# Patient Record
Sex: FEMALE | Race: WHITE
[De-identification: names, ages, dates, MRNs, and addresses within clinical notes are randomized per-mention and may not be internally consistent; named-entity substitution may affect disease eponyms.]

---

## 2020-11-10 NOTE — NUR
ADMISSION NOTE

Received patient from ER via leon, received report from ISAAK BAUMAN. Patient admitted with 
diagnosis of ACUTE CHOLECYSTITIS. Patient oriented to hospital routine, call light, 
toileting and safety-patient verbalized understanding.

## 2020-11-10 NOTE — NUR
Patient triaged and placed on wall. VSS and patient appears in no acute 
distress at this time. Accompanied by EMTs, awaiting available bed, and MD 
notified of need for MSE.

## 2020-11-10 NOTE — NUR
Initial RN notes



Pt asleep, easily awakens, no s/s distress noted.  Pt denies pain at this time, was given 
morphine in ER.  IVF started at ordered rate on L. AC 20G good blood return.  Call light 
within reach.  Bed low, locked, siderails up x2, alarm on.  To monitor.

## 2020-11-10 NOTE — NUR
Patient presented to ER C/O abdominal pain. Patient BIB BLS to ER, A&Ox4, temp 
100.0, skin pink & warm, denies N/V/D, pain 10/10. Patient states she has 
adominal pain x1 week. Patient reports PMD referred for surgical intervention 
to remove IUD, patient did not follow-up.

## 2020-11-10 NOTE — NUR
Patient will be admitted to care of [facility].  Admitted to MED SURG unit. 
Belongings list completed.  Complete and up to date summary report printed. 
SBAR report to be given at bedside with opportunity for questions.

## 2020-11-10 NOTE — NUR
-------------------------------------------------------------------------------

            *** Note annette in EDM - 11/10/20 at 2142 by MARCELO ***            

-------------------------------------------------------------------------------

[facility]  Admitted to MED SURG unit.  Will go 
to room 107B.  Belongings list completed.  Complete and up to date summary 
report printed. SBAR report to be given at bedside with opportunity for 
questions.

## 2020-11-11 NOTE — NUR
ROUNDS

PATIENT RESTING COMFORTABLY IN BED, NOT IN DISTRESS, VITALS STABLE. NO PAIN AT THIS TIME. 
ASSESSMENT DONE AND DOCUEMNTED. SEE FLOWSHEET. NEEDS ATTENDED TO. SAFETY MEASURES IN PLACED. 
CALL LIGHT PLACED WITHIN REACH.

## 2020-11-11 NOTE — NUR
Dietitian Recommendations 

*Recommend continue Low Fat Diet

*Assess educational needs upon next visit if/when applicable. 



Please see Nutrition Assessment for details.

NEVILLE BEAL

## 2020-11-11 NOTE — NUR
Closing notes



Pt asleep, easily awakens, no s/s distress noted.  Pt states pain is ok at this time.  IVF 
infusing at ordered rate on L. AC 20G clear and patent.  Call light within reach.  Bed low, 
locked, siderails up x2.  To endorse to AM nurse.

## 2020-11-11 NOTE — NUR
NURSE NOTE

ADMINISTERED PAIN MEDICATION, 8/10, PATIENT IN BED, EYES CLOSED, BED IN LOW AND LOCKED 
POSITION, CALL LIGHT WITHIN REACH

## 2020-11-11 NOTE — NUR
Pain 



Pt c/o severe R.Abd pain, medicated with Morphine 2mg IVP as needed.  IVF infusing at 
ordered rate L. AC no s/s infiltration.  Call light within reach.  To monitor.

## 2020-11-11 NOTE — NUR
Rounds 



Pt asleep, no s/s distress noted.  IVF infusing at ordered rate clear and patent.  Call 
light within reach.  To monitor.

## 2020-11-11 NOTE — NUR
Nutrition Update



Mina scale 18 noted.

Pt admitted for acute cholecystitis 

Diet: NPO

BMI: 20.8 kg/m2



RD to follow per nutrition care standards

## 2020-11-11 NOTE — NUR
nurse note

assisted patient to ambulate to the bathroom, returned to bed, respirations even, non 
labored, bed in low and locked position, call light within reach

## 2020-11-11 NOTE — NUR
closing note

Provided SBAR to night RN, patient in bed, respirations even, non labored, bed in low and 
locked position, call light within reach, endorsed care to night RN

## 2020-11-11 NOTE — NUR
NURSE NOTE

OBTAINED VS, MRSA, AND URINE SPECIMEN COLLECTED, PATIENT IN BED, RESPIRATIONS EVEN, NON 
LABORED, BED IN LOW AND LOCKED POSITION, CALL LIGHT WITHIN REACH

## 2020-11-11 NOTE — NUR
nurse note

patient complaining of pain, requesting pain medication, repositioned patient, respirations 
even, non labored, bed in low and locked position, call light within reach, bed alarm on

## 2020-11-11 NOTE — NUR
NURSE NOTE

RETURNED FROM SURGERY, PATIENT IN BED, RESPIRATIONS EVEN, NON LABORED, AMBULATED TO 
BATHROOM, VOIDED, RETURNED TO BED, OBTAINED VS, BED IN LOW AND LOCKED POSITION, CALL LIGHT 
WITHIN REACH, SCD'S ON IVF'S RUNNING AS ORDERED, BED ALARM ON

## 2020-11-11 NOTE — NUR
nurse note

assisted patient ambulate to the bathroom, returned to bed, administered medications, hung 
new IVF"s as ordered.  Patient states 6/10 pain but does not want medication yet

## 2020-11-11 NOTE — NUR
Bathroom



Pt called to use bathroom, assisted to bathroom, steady gait, pt denies dizziness.  Pt 
voided.  To monitor.

## 2020-11-11 NOTE — NUR
OPENING NOTE

RECEIVED BEDSIDE SBAR FROM NIGHT RN, PATIENT IN BED, RESPIRATIONS EVEN, NON LABORED, BED IN 
LOW AND LOCKED POSITION, CALL LIGHT WITHIN REACH,

## 2020-11-12 NOTE — NUR
TARYN EPPSSYN 

HUNG ZOSYN AS ORDERED RATE. NO S/S OF ADVERSE REACTION NOTED. PT TOLERATING WELL. NO S/S OF 
ACUTE DISTRESS NOTED. CALL LIGHT WITHIN REACH. SIDE RAILS UP. BED LOCKED IN LOWEST POSITION. 
SAFETY PRECAUTIONS MAINTAINED. WILL CONTINUE TO MONITOR.

## 2020-11-12 NOTE — NUR
PAIN/MORPHINE

PT REPORTING ABDOMINAL INCISION PAIN. ADMINISTERED MORPHINE PAIN MEDICATIONS AS ORDERED PRN. 
DISCUSSED MEDICATION ACTIONS AND POTENTIAL SIDE EFFECTS. PT VERBALIZED UNDERSTANDING. 
BREATHING EVEN AND UNLABORED TO ROOM AIR. CALL LIGHT WITHIN REACH. BED LOCKED IN LOWEST 
POSITION. SAFETY PRECAUTIONS IN PLACE. WILL CONTINUE TO MONITOR.

## 2020-11-12 NOTE — NUR
wide awake now, eating the [employer] sandwich, brought from home, to the patient, only 1/2 
sandwich, and one big bowl of soup.

denied pain right now, IS initiated, went up to 500, x 5times.  Right now getting ZYVOX 
ivpb.  Fully aware after all abx given, she will get out of be to ambulate with staff.

Denied pain at this time

## 2020-11-12 NOTE — NUR
CONSULTATION PAGED/CALLED

Reason for Consultation: []  GANGRENOUS CHOLECYSTITIS

Person Who was Notified: []  JESSICA

Consulting Physician: []  DR BASSETT

Consultant Specialty: []  ID

Ordering Physician: []  DR CARO

## 2020-11-12 NOTE — NUR
sleepy when first met, few words exchanged.  c/o of pain on incisional sites, one po NORCO 
given, with relief

K+ down to 3.2, 40meq po given now.  did not eat any breakfast, just want to rest now .

## 2020-11-12 NOTE — NUR
OPENING NOTES

RECEIVED REPORT FROM DAY SHIFT RN. PT RESTING IN BED, ALERT & ORIENTED X4. BREATHING EVEN 
AND UNLABORED TO ROOM AIR. PT ENRIQUE ANY SHORTNESS OF BREATH. IV ON LEFT AC 20G INTACT, IVF 
RUNNING AS ORDERED RATE. NO SIGNS OF INFILTRATION NOTED. CALL LIGHT WITHIN REACH. SIDE RAILS 
UP X3. BED LOCKED IN LOWEST POSITION. SAFETY PRECAUTIONS MAINTAINED. WILL CONTINUE TO 
MONITOR.

## 2020-11-12 NOTE — NUR
SS NOTES:



MSW was referred by SS to see patient for self-pay and DCP.



MSW met with patient at bedside. Pt was alert and oriented. Pt was grimacing but cooperative 
throughout the interview. Patient stated she is in pain and needed to use the restroom; MSW 
escorted patient to restroom. Pt stated she lives in [place] but is currently staying at 
her friend Benjamin's home in [place]. Patient stated she is unemployed and no source of 
income. Pt denies any history or current mental health or substance use/abuse. MSW 
encouraged patient to provide necessary paperwork for [employer]; pt agreed. When discharge, 
pt's friend Benjamin will pick her up. [facility] resource and [employer] phone number 
provided. No further SS needs identified.

## 2020-11-13 NOTE — NUR
RN NOTES:

CNA CAME AND STATED THAT PT STATED WHILE PT WAS IN THE RESTROOM , ( WHEN SHE WAS GETTING UP 
FROM THE COMMODE) SHE HIT HER RIGHT EYELID CORNER ON THE IV PUMP , WENT TO THE PT ROOM 
ASSESSED , PT STATED SHE HAS MILD PAIN ON THE AREA , NO REDNESS , NO CUTS OR BRUISES ,NO 
BUMPS OR SWELLING NOTICED AT THIS TIME . WILL NOTIFY PRIMARY RN AND WILL REQUEST TO MONITOR 
IT . PROVIDED ICE PACK .

## 2020-11-13 NOTE — NUR
CLOSING NOTES

PT RESTING IN BED. BREATHING EVEN AND UNLABORED TO ROOM AIR. PT ENRIQUE ANY SHORTNESS OF 
BREATH. IV ON LEFT AC 20G INTACT, IVF RUNNING AS ORDERED RATE. NO SIGNS OF INFILTRATION 
NOTED. CALL LIGHT WITHIN REACH. SIDE RAILS UP X3. BED LOCKED IN LOWEST POSITION. SAFETY 
PRECAUTIONS MAINTAINED. ALL NEEDS ARE MET THROUGHOUT SHIFT. WILL CONTINUE TO MONITOR UNTIL 
ENDORSE TO DAY SHIFT RN.

## 2020-11-13 NOTE — NUR
RN ROUNDS

ASSESSED AND RECHECKED RIGHT EYELID CORNER, NO REDNESS, BRUISES,AND SWELLING NOTED. PT ENRIQUE 
ANY PAIN AT THIS TIME. WILL KEEP CONTINUE TO MONITOR.

## 2020-11-13 NOTE — NUR
alert, oriented, in happy moods today, and wants to talk.  Denied pain, seen by attending 
this time, dr Francisco, patient is discharged to home, with RX

1/ Norco for pain, 5/325mg by mouth, every 4hrs as needed

 2/ Levaquin, and Flagyl, by mouth, E RX, called in by the physician.

To follow up with dr Alireza Green, as outpatient. 

Discharge instructions reviewed with the patient, verbalized understanding, patient is 
discharged to home with signficant other at 12noon, today

## 2020-12-06 ENCOUNTER — HOSPITAL ENCOUNTER (EMERGENCY)
Dept: HOSPITAL 4 - SED | Age: 25
Discharge: HOME | End: 2020-12-06
Payer: MEDICAID

## 2020-12-06 VITALS — SYSTOLIC BLOOD PRESSURE: 119 MMHG

## 2020-12-06 VITALS — HEIGHT: 57 IN | BODY MASS INDEX: 22.22 KG/M2 | WEIGHT: 103 LBS

## 2020-12-06 DIAGNOSIS — R10.32: Primary | ICD-10-CM

## 2020-12-06 DIAGNOSIS — Z79.899: ICD-10-CM

## 2020-12-06 LAB
ALBUMIN SERPL BCP-MCNC: 3.5 G/DL (ref 3.4–4.8)
ALT SERPL W P-5'-P-CCNC: 48 U/L (ref 12–78)
ANION GAP SERPL CALCULATED.3IONS-SCNC: 9 MMOL/L (ref 5–15)
APPEARANCE UR: CLEAR
AST SERPL W P-5'-P-CCNC: 31 U/L (ref 10–37)
BASOPHILS # BLD AUTO: 0 K/UL (ref 0–0.2)
BASOPHILS NFR BLD AUTO: 0.5 % (ref 0–2)
BILIRUB SERPL-MCNC: 0.4 MG/DL (ref 0–1)
BILIRUB UR QL STRIP: NEGATIVE
BUN SERPL-MCNC: 12 MG/DL (ref 8–21)
CALCIUM SERPL-MCNC: 8.4 MG/DL (ref 8.4–11)
CHLORIDE SERPL-SCNC: 105 MMOL/L (ref 98–107)
COLOR UR: YELLOW
CREAT SERPL-MCNC: 0.57 MG/DL (ref 0.55–1.3)
EOSINOPHIL # BLD AUTO: 0.1 K/UL (ref 0–0.4)
EOSINOPHIL NFR BLD AUTO: 2 % (ref 0–4)
ERYTHROCYTE [DISTWIDTH] IN BLOOD BY AUTOMATED COUNT: 15.9 % (ref 9–15)
GFR SERPL CREATININE-BSD FRML MDRD: 164 ML/MIN (ref 90–?)
GLUCOSE SERPL-MCNC: 86 MG/DL (ref 70–99)
GLUCOSE UR STRIP-MCNC: NEGATIVE MG/DL
HCT VFR BLD AUTO: 40.7 % (ref 36–48)
HGB BLD-MCNC: 13.3 G/DL (ref 12–16)
HGB UR QL STRIP: NEGATIVE
KETONES UR STRIP-MCNC: NEGATIVE MG/DL
LEUKOCYTE ESTERASE UR QL STRIP: NEGATIVE
LIPASE SERPL-CCNC: 155 U/L (ref 73–393)
LYMPHOCYTES # BLD AUTO: 2.3 K/UL (ref 1–5.5)
LYMPHOCYTES NFR BLD AUTO: 34.5 % (ref 20.5–51.5)
MCH RBC QN AUTO: 30 PG (ref 27–31)
MCHC RBC AUTO-ENTMCNC: 33 % (ref 32–36)
MCV RBC AUTO: 90 FL (ref 79–98)
MONOCYTES # BLD MANUAL: 0.8 K/UL (ref 0–1)
MONOCYTES # BLD MANUAL: 11.6 % (ref 1.7–9.3)
NEUTROPHILS # BLD AUTO: 3.5 K/UL (ref 1.8–7.7)
NEUTROPHILS NFR BLD AUTO: 51.4 % (ref 40–70)
NITRITE UR QL STRIP: NEGATIVE
PH UR STRIP: 5.5 [PH] (ref 5–8)
PLATELET # BLD AUTO: 459 K/UL (ref 130–430)
POTASSIUM SERPL-SCNC: 3.6 MMOL/L (ref 3.5–5.1)
PROT UR QL STRIP: NEGATIVE
RBC # BLD AUTO: 4.51 MIL/UL (ref 4.2–6.2)
SODIUM SERPLBLD-SCNC: 139 MMOL/L (ref 136–145)
SP GR UR STRIP: >=1.03 (ref 1–1.03)
UROBILINOGEN UR STRIP-MCNC: 0.2 MG/DL (ref 0.2–1)
WBC # BLD AUTO: 6.7 K/UL (ref 4.8–10.8)

## 2020-12-06 PROCEDURE — 87591 N.GONORRHOEAE DNA AMP PROB: CPT

## 2020-12-06 PROCEDURE — 76857 US EXAM PELVIC LIMITED: CPT

## 2020-12-06 PROCEDURE — 87040 BLOOD CULTURE FOR BACTERIA: CPT

## 2020-12-06 PROCEDURE — 81003 URINALYSIS AUTO W/O SCOPE: CPT

## 2020-12-06 PROCEDURE — 76830 TRANSVAGINAL US NON-OB: CPT

## 2020-12-06 PROCEDURE — 96361 HYDRATE IV INFUSION ADD-ON: CPT

## 2020-12-06 PROCEDURE — 36415 COLL VENOUS BLD VENIPUNCTURE: CPT

## 2020-12-06 PROCEDURE — 85025 COMPLETE CBC W/AUTO DIFF WBC: CPT

## 2020-12-06 PROCEDURE — 74176 CT ABD & PELVIS W/O CONTRAST: CPT

## 2020-12-06 PROCEDURE — 96374 THER/PROPH/DIAG INJ IV PUSH: CPT

## 2020-12-06 PROCEDURE — 87210 SMEAR WET MOUNT SALINE/INK: CPT

## 2020-12-06 PROCEDURE — 96372 THER/PROPH/DIAG INJ SC/IM: CPT

## 2020-12-06 PROCEDURE — 80053 COMPREHEN METABOLIC PANEL: CPT

## 2020-12-06 PROCEDURE — 83690 ASSAY OF LIPASE: CPT

## 2020-12-06 PROCEDURE — 99285 EMERGENCY DEPT VISIT HI MDM: CPT

## 2020-12-06 PROCEDURE — 81025 URINE PREGNANCY TEST: CPT

## 2020-12-06 PROCEDURE — 87491 CHLMYD TRACH DNA AMP PROBE: CPT
